# Patient Record
(demographics unavailable — no encounter records)

---

## 2025-01-14 NOTE — HISTORY OF PRESENT ILLNESS
[TextBox_4] : Subjective: - Summary : The patient is here for a routine follow-up visit to assess the management of his sleep apnea using CPAP therapy. - Chief Complaint (CC) : No specific complaints; routine follow-up for sleep apnea management. - History of Present Illness : The patient, a male with a history of drop-to-sleep apnea, presents for a routine follow-up visit. He reports being very compliant with his CPAP therapy and states that he has not had any problems. The patient mentions feeling more energetic in the morning when using the CPAP machine. He recounts an incident where he stayed overnight in [place] without his machine and noticed poorer sleep quality. The patient is diligent about maintaining his CPAP equipment, cleaning it regularly, and ensuring he has extra supplies on hand. He occasionally experiences dryness when waking up, which may be due to mouth breathing or mask leakage.

## 2025-01-14 NOTE — ASSESSMENT
[FreeTextEntry1] : Assessment: SHANNAN Obesity EDS controlled   PLAN: The patient is using and benefitting from the PAP device. There is good compliance with the CPAP. New supplies will be ordered when required. Weight loss maintenance discussed. I stressed the need maintain compliance with the PAP device. The patient is not to use an Ozone or UV sterilizer. The patient was educated in the absolute requirement to use the PAP device nightly   F/U in 12 months

## 2025-01-14 NOTE — PHYSICAL EXAM
[No Acute Distress] : no acute distress [Normal Oropharynx] : normal oropharynx [Enlarged Base of the Tongue] : enlarged base of the tongue [Nasal congestion] : nasal congestion [Normal Appearance] : normal appearance [No Neck Mass] : no neck mass [Normal Rate/Rhythm] : normal rate/rhythm [Normal S1, S2] : normal s1, s2 [No Murmurs] : no murmurs [No Resp Distress] : no resp distress [Clear to Auscultation Bilaterally] : clear to auscultation bilaterally [No Abnormalities] : no abnormalities [Benign] : benign [Normal Gait] : normal gait [No Clubbing] : no clubbing [No Cyanosis] : no cyanosis [No Edema] : no edema [FROM] : FROM [Normal Color/ Pigmentation] : normal color/ pigmentation [No Focal Deficits] : no focal deficits [Oriented x3] : oriented x3 [Normal Affect] : normal affect